# Patient Record
Sex: FEMALE | Race: WHITE | URBAN - METROPOLITAN AREA
[De-identification: names, ages, dates, MRNs, and addresses within clinical notes are randomized per-mention and may not be internally consistent; named-entity substitution may affect disease eponyms.]

---

## 2017-10-23 ENCOUNTER — APPOINTMENT (RX ONLY)
Dept: URBAN - METROPOLITAN AREA CLINIC 12 | Facility: CLINIC | Age: 42
Setting detail: DERMATOLOGY
End: 2017-10-23

## 2017-10-23 DIAGNOSIS — D22 MELANOCYTIC NEVI: ICD-10-CM

## 2017-10-23 DIAGNOSIS — L98.8 OTHER SPECIFIED DISORDERS OF THE SKIN AND SUBCUTANEOUS TISSUE: ICD-10-CM

## 2017-10-23 DIAGNOSIS — L81.4 OTHER MELANIN HYPERPIGMENTATION: ICD-10-CM

## 2017-10-23 PROBLEM — D22.72 MELANOCYTIC NEVI OF LEFT LOWER LIMB, INCLUDING HIP: Status: ACTIVE | Noted: 2017-10-23

## 2017-10-23 PROBLEM — K21.9 GASTRO-ESOPHAGEAL REFLUX DISEASE WITHOUT ESOPHAGITIS: Status: ACTIVE | Noted: 2017-10-23

## 2017-10-23 PROBLEM — L90.8 OTHER ATROPHIC DISORDERS OF SKIN: Status: ACTIVE | Noted: 2017-10-23

## 2017-10-23 PROBLEM — D22.5 MELANOCYTIC NEVI OF TRUNK: Status: ACTIVE | Noted: 2017-10-23

## 2017-10-23 PROBLEM — L85.3 XEROSIS CUTIS: Status: ACTIVE | Noted: 2017-10-23

## 2017-10-23 PROBLEM — E05.90 THYROTOXICOSIS, UNSPECIFIED WITHOUT THYROTOXIC CRISIS OR STORM: Status: ACTIVE | Noted: 2017-10-23

## 2017-10-23 PROBLEM — D22.62 MELANOCYTIC NEVI OF LEFT UPPER LIMB, INCLUDING SHOULDER: Status: ACTIVE | Noted: 2017-10-23

## 2017-10-23 PROBLEM — L57.0 ACTINIC KERATOSIS: Status: ACTIVE | Noted: 2017-10-23

## 2017-10-23 PROBLEM — L29.8 OTHER PRURITUS: Status: ACTIVE | Noted: 2017-10-23

## 2017-10-23 PROBLEM — J30.1 ALLERGIC RHINITIS DUE TO POLLEN: Status: ACTIVE | Noted: 2017-10-23

## 2017-10-23 PROCEDURE — 11100: CPT

## 2017-10-23 PROCEDURE — ? BIOPSY BY SHAVE METHOD

## 2017-10-23 PROCEDURE — ? IN-HOUSE DISPENSING PHARMACY

## 2017-10-23 PROCEDURE — 99203 OFFICE O/P NEW LOW 30 MIN: CPT | Mod: 25

## 2017-10-23 ASSESSMENT — LOCATION DETAILED DESCRIPTION DERM
LOCATION DETAILED: LEFT PLANTAR FOREFOOT OVERLYING 1ST METATARSAL
LOCATION DETAILED: SUPERIOR THORACIC SPINE
LOCATION DETAILED: LEFT ANTERIOR DISTAL UPPER ARM
LOCATION DETAILED: LEFT DISTAL POSTERIOR UPPER ARM
LOCATION DETAILED: RIGHT SUPERIOR MEDIAL BUCCAL CHEEK

## 2017-10-23 ASSESSMENT — LOCATION SIMPLE DESCRIPTION DERM
LOCATION SIMPLE: LEFT PLANTAR SURFACE
LOCATION SIMPLE: UPPER BACK
LOCATION SIMPLE: LEFT UPPER ARM
LOCATION SIMPLE: RIGHT CHEEK
LOCATION SIMPLE: LEFT POSTERIOR UPPER ARM

## 2017-10-23 ASSESSMENT — LOCATION ZONE DERM
LOCATION ZONE: TRUNK
LOCATION ZONE: ARM
LOCATION ZONE: FEET
LOCATION ZONE: FACE

## 2017-10-23 NOTE — PROCEDURE: BIOPSY BY SHAVE METHOD
Silver Nitrate Text: The wound bed was treated with silver nitrate after the biopsy was performed.
Dressing: bandage
Bill [id number] For Specimen Handling/Conveyance To Laboratory?: no
Wound Care: Petrolatum
Notification Instructions: Patient will be notified of biopsy results. However, patient instructed to call the office if not contacted within 2 weeks.
Cryotherapy Text: The wound bed was treated with cryotherapy after the biopsy was performed.
Triangulation (Location Of Lesion In Relation To Distance From Anatomical Landmarks): See photo
Biopsy Type: H and E
Post-Care Instructions: I reviewed with the patient in detail post-care instructions. Patient is to keep the biopsy site dry overnight, and then apply bacitracin twice daily until healed. Patient may apply hydrogen peroxide soaks to remove any crusting.
Size Of Lesion In Cm: 0.2
Detail Level: Detailed
Hemostasis: Aluminum Chloride
Lab: [id number]
Anesthesia Type: 1% lidocaine without epinephrine
Biopsy Method: Dermablade
Type Of Destruction Used: Electrodesiccation and Curettage
Additional Anesthesia Volume In Cc (Will Not Render If 0): 0
Electrodesiccation And Curettage Text: The wound bed was treated with electrodesiccation and curettage after the biopsy was performed.
Anesthesia Volume In Cc (Will Not Render If 0): 0.4
Billing Type: Third-Party Bill
Electrodesiccation Text: The wound bed was treated with electrodesiccation after the biopsy was performed.
Curettage Text: Base of lesion was treated with Electrodesiccation and curettage x3 rounds
Consent: Written consent was obtained and risks were reviewed including but not limited to scarring, infection, bleeding, scabbing, incomplete removal, nerve damage and allergy to anesthesia.

## 2017-10-23 NOTE — PROCEDURE: IN-HOUSE DISPENSING PHARMACY
Product 73 Units Dispensed: 0
Product 10 Application Directions: Apply to wound BID X 1-2 weeks
Product 43 Unit Type: mg
Name Of Product 33: Wart Gel 932367 - Cimetidine 5%/Ibuprofen 2%/Salicylic Acid 17%
Render Refills If Set To 0: Yes
Name Of Product 5: Acne Gel with Dapsone - (433856)\\nDapsone 8.5%- Niacinamide 2%- Spironolactone 5%
Product 1 Amount/Unit (Numbers Only): 30
Product 9 Amount/Unit (Numbers Only): 15
Product 23 Price/Unit (In Dollars): 45.00
Name Of Product 22: Clob Ointment [id number] - Clobetasol Propionate 0.05%/Niacinamide 4%
Name Of Product 6: Acne Gel Combo - (989014)\\nBenzoyl Peroxide 5%- Clindamycin 1%- \\nNiacinamide 4%
Name Of Product 31: Antibacterial Wound Ointment [id number] - Aloe Vera 0.2%/Lidocaine 2%/Mupirocin 2%/Tranilast 1%
Product 5 Amount/Unit (Numbers Only): 30
Product 1 Application Directions: Apply 1 pump (pea-size amount) to entire face at bed time.
Name Of Product 3: Tret 0.1% Cream - (629642) \\nNiacinamide 4%- Tretinoin 0.1%
Name Of Product 7: Acne Gel - (964274)\\nAdapalene 0.3%- Benzoyl Peroxide 2.5%- \\nNiacinimide 4%
Product 3 Refills: 5
Product 7 Unit Type: ml
Name Of Product 11: Imiquimod Combo Gel [id number] - Imiquimod 5%/Levocetirizine 1%/Niacinamide 2%
Product 22 Application Directions: Apply to active areas PRN
Product 10 Price/Unit (In Dollars): 20.00
Product 26 Application Directions: Apply to scalp qhs x 2 weeks
Name Of Product 1: Tret 0.025% Cream- (494935)  \\nNiacinamide 4%- Tretinoin 0.025%
Product 21 Price/Unit (In Dollars): 35.00
Product 8 Application Directions: Apply topically to affected area's as directed by physician
Product 32 Price/Unit (In Dollars): 25.00
Name Of Product 24: Triam Dermatitis Cream 780704 - Niacinamide 4%/Triamcinolone Acetonide 0.1%
Product 2 Units Dispensed: 1
Product 4 Application Directions: Apply pea-sized amount to entire face QHS
Product 4 Refills: 3
Product 23 Application Directions: Apply to hand QD to BID PRN
Product 31 Application Directions: Apply to affected areas QD-BID
Product 12 Application Directions: Apply one pump amount to cleansed face QD
Product 21 Application Directions: Apply to affected area on scalp BID for a month
Detail Level: Zone
Name Of Product 2: Tret 0.05% Cream- (369016)  \\nNiacinamide 4%- Tretinoin 0.05%
Name Of Product 8: Actinic Keratosis Gel - ([id number])\\nImiquimod 5%- Levocetirizine Dihydrochloride 1%- Niacinamide 2%
Name Of Product 26: Fluocinolone Scalp & Body Oil 209369 - Fluocinolone Acetonide 0.01% In Emu and Olive Oil
Product 21 Amount/Unit (Numbers Only): 60
Product 9 Application Directions: Apply to affected toenails QD
Product 26 Amount/Unit (Numbers Only): 60
Product 1 Price/Unit (In Dollars): 40.00
Product 7 Application Directions: Apply topically to affected area's QD-BID
Product 11 Application Directions: Apply to warts at bedtime.
Name Of Product 32: Anti-Fungal Nail Solution [id number] - Ciclopirox 8%/Fluconazole 1%/Terbinafine 1%
Name Of Product 4: Valentin 0.1% Cream - (425794) \\nNiacinamide 4%- Tazoratene 0.1%
Product 32 Application Directions: Apply to nail QHS
Name Of Product 23: Clob Cream 809461- Clobetasol Propionate 0.05%/Niacinamide 4%
Name Of Product 9: Anti-Fungal Nail Solution  ([id number])
Name Of Product 12: Rosacea Cream 147307 - Ivermectin 1%/Metronidazole 1%/Niacinamide 4%/Potassium Azeloyl Diglycinate 5%
Name Of Product 10: Antibacterial Wound Ointment  (690276)
Name Of Product 25: Desonide Dermatitis Cream 200802 - Desonide 0.05%/Niacinamide 4%
Name Of Product 21: Clob Solution 011311 - Clobetasol Propionate 0.05%/Niacinamide 4%